# Patient Record
Sex: MALE | Race: OTHER | Employment: FULL TIME | URBAN - METROPOLITAN AREA
[De-identification: names, ages, dates, MRNs, and addresses within clinical notes are randomized per-mention and may not be internally consistent; named-entity substitution may affect disease eponyms.]

---

## 2018-11-24 ENCOUNTER — HOSPITAL ENCOUNTER (EMERGENCY)
Facility: HOSPITAL | Age: 38
Discharge: HOME | End: 2018-11-24
Attending: EMERGENCY MEDICINE
Payer: OTHER MISCELLANEOUS

## 2018-11-24 VITALS
SYSTOLIC BLOOD PRESSURE: 127 MMHG | WEIGHT: 220 LBS | OXYGEN SATURATION: 97 % | HEART RATE: 74 BPM | DIASTOLIC BLOOD PRESSURE: 88 MMHG | TEMPERATURE: 99 F | RESPIRATION RATE: 14 BRPM

## 2018-11-24 DIAGNOSIS — S61.312A LACERATION OF RIGHT MIDDLE FINGER WITHOUT FOREIGN BODY WITH DAMAGE TO NAIL, INITIAL ENCOUNTER: Primary | ICD-10-CM

## 2018-11-24 PROCEDURE — 99283 EMERGENCY DEPT VISIT LOW MDM: CPT

## 2018-11-24 PROCEDURE — 90471 IMMUNIZATION ADMIN: CPT

## 2018-11-25 NOTE — ED PROVIDER NOTES
Patient Seen in: [facility] AND [facility] Emergency Department    History   Patient presents with:  Laceration Abrasion (integumentary)    Stated Complaint: lac    HPI    16-year-old male was at work and lacerated his right middle finger with a kitchen knife. Psychiatric: He has a normal mood and affect. His behavior is normal.   Nursing note and vitals reviewed. ED Course   Labs Reviewed - No data to display     no suture repair. Surgicel and bulky dressing applied.          MDM               Dispos

## (undated) NOTE — LETTER
Date & Time: 11/24/2018, 10:48 PM  Patient: Susan Jenkins  Encounter Provider(s):    Justin Chan MD       To Whom It May Concern:    Susan Jenikns was seen and treated in our department on 11/24/2018. He may return to work 11/26/2018.     If

## (undated) NOTE — LETTER
Date & Time: 11/24/2018, 10:33 PM  Patient: Devin Haynessunynpatsy  Encounter Provider(s):    Michelet Angulo MD       Occupational restrictions  For: Devin Santos    Normal weight limits  Normal arm and hand activities  Normal leg activities  Normal back

## (undated) NOTE — ED AVS SNAPSHOT
Alois Rinne   MRN: [id number]    Department:  [facility] Emergency Department   Date of Visit:  11/24/2018           Disclosure     Insurance plans vary and the physician(s) referred by the ER may not be covered by your plan.  Please contac CARE PHYSICIAN AT ONCE OR RETURN IMMEDIATELY TO THE EMERGENCY DEPARTMENT. If you have been prescribed any medication(s), please fill your prescription right away and begin taking the medication(s) as directed.   If you believe that any of the medications